# Patient Record
Sex: MALE | NOT HISPANIC OR LATINO | Employment: FULL TIME | URBAN - METROPOLITAN AREA
[De-identification: names, ages, dates, MRNs, and addresses within clinical notes are randomized per-mention and may not be internally consistent; named-entity substitution may affect disease eponyms.]

---

## 2024-09-28 ENCOUNTER — OFFICE VISIT (OUTPATIENT)
Dept: URGENT CARE | Age: 34
End: 2024-09-28
Payer: COMMERCIAL

## 2024-09-28 VITALS
RESPIRATION RATE: 16 BRPM | TEMPERATURE: 97.8 F | SYSTOLIC BLOOD PRESSURE: 121 MMHG | DIASTOLIC BLOOD PRESSURE: 76 MMHG | OXYGEN SATURATION: 98 % | HEART RATE: 67 BPM

## 2024-09-28 DIAGNOSIS — S83.401A SPRAIN OF COLLATERAL LIGAMENT OF RIGHT KNEE, INITIAL ENCOUNTER: Primary | ICD-10-CM

## 2024-09-28 DIAGNOSIS — M25.461 EFFUSION OF RIGHT KNEE JOINT: ICD-10-CM

## 2024-09-28 RX ORDER — METHYLPREDNISOLONE 4 MG/1
TABLET ORAL
Qty: 21 TABLET | Refills: 0 | Status: SHIPPED | OUTPATIENT
Start: 2024-09-28

## 2024-09-28 ASSESSMENT — PAIN SCALES - GENERAL: PAINLEVEL: 4

## 2024-09-28 NOTE — PATIENT INSTRUCTIONS
You have a right knee sprain with effusion please apply cold compresses to affected area for 20-30 minutes daily to reduce swelling.  May take Tylenol (acetaminophen) 325 mg, 2 tablets by mouth every 4-6 hours as needed for discomfort.   Please take Medrol as a single dose early in the day with food  May use diclofenac 1% gel topically as per label instructions to the injured knee.  Please follow-up with primary care provider in 7-10 days  If you have increased pain, redness, swelling, numbness, tingling, weakness return to urgent care or go to emergency department for further evaluation  This note was generated by voice recognition software. Minor transcription/grammatical errors may be present. Please call for clarification.

## 2024-10-10 ASSESSMENT — ENCOUNTER SYMPTOMS
FREQUENCY: 0
CHILLS: 0
WHEEZING: 0
WOUND: 0
ARTHRALGIAS: 1
DIARRHEA: 0
HEADACHES: 0
MYALGIAS: 0
NAUSEA: 0
DYSURIA: 0
SINUS PRESSURE: 1
CONSTIPATION: 0
RHINORRHEA: 1
SHORTNESS OF BREATH: 0
BACK PAIN: 0
FEVER: 0
JOINT SWELLING: 1
NECK STIFFNESS: 0
VOMITING: 0
SORE THROAT: 0
CHEST TIGHTNESS: 0

## 2024-10-10 NOTE — PROGRESS NOTES
"Subjective   Patient ID: Jelani Bender is a 33 y.o. male.    33-year-old male presents with a complaint of swelling of his right knee that began yesterday.  He reports that 2 weeks ago while playing flag football he dove to make a grab of the flag and landed on his right knee.  He reports that he \"works out a lot\".  Vital signs are reviewed. Alert and oriented x3 with normal mood and affect  Patient is well nourished, well-developed, alert and in no acute distress  No pain to palpation over frontal, ethmoid or maxillary sinus areas    External eyes, orbits, conjunctiva and eyelids are normal in appearance  Pupils are equal, round, reactive to light and accommodation, extraocular movements intact    External ears appear normal  External canals are normal in appearance  Right tympanic membrane is intact and pearly gray in appearance  Left tympanic membrane is intact and pearly gray in appearance  There is no middle ear effusion noted on the right  There is no middle ear effusion noted on the left  External appearance of the nose is normal  Nasal mucosa, septum, turbinates are pink in appearance  There is no nasal discharge in both nares    Oral mucosa is uniformly pink and moist  Palate is pink, symmetric and intact  Tongue is moist, mobile and midline  Tonsils are present, not enlarged, not erythematous with no concretions or exudates present  No cervical lymphadenopathy palpated    Heart has regular rate and rhythm. No murmurs, rubs or gallops are auscultated at this exam.    Respiratory rate rhythm and effort are normal. Breath sounds bilaterally are clear on auscultation without crackles, rhonchi, wheezes or friction rub.    Abdomen: Normal bowel sounds on auscultation. Soft, nontender without rebound or rigidity on palpation        Vital signs are reviewed. Alert and oriented x3 with normal mood and affect  Patient is well nourished, well-developed, alert and in no acute distress  No pain to palpation " over frontal, ethmoid or maxillary sinus areas    External eyes, orbits, conjunctiva and eyelids are normal in appearance  Pupils are equal, round, reactive to light and accommodation, extraocular movements intact    External ears appear normal  External canals are normal in appearance  Right tympanic membrane is intact and pearly gray in appearance  Left tympanic membrane is intact and pearly gray in appearance  There is no middle ear effusion noted on the right  There is no middle ear effusion noted on the left  External appearance of the nose is normal  Nasal mucosa, septum, turbinates are pink in appearance  There is no nasal discharge in both nares    Oral mucosa is uniformly pink and moist  Palate is pink, symmetric and intact  Tongue is moist, mobile and midline  Tonsils are present, not enlarged, not erythematous with no concretions or exudates present  No cervical lymphadenopathy palpated    Heart has regular rate and rhythm. No murmurs, rubs or gallops are auscultated at this exam.    Respiratory rate rhythm and effort are normal. Breath sounds bilaterally are clear on auscultation without crackles, rhonchi, wheezes or friction rub.    Abdomen: Normal bowel sounds on auscultation. Soft, nontender without rebound or rigidity on palpation              History provided by:  Patient  Knee Pain         The following portions of the chart were reviewed this encounter and updated as appropriate:  Allergies  Meds  Problems  Med Hx  Surg Hx  Fam Hx         Review of Systems   Constitutional:  Negative for chills and fever.   HENT:  Positive for congestion, rhinorrhea and sinus pressure. Negative for ear pain and sore throat.    Respiratory:  Negative for chest tightness, shortness of breath and wheezing.    Gastrointestinal:  Negative for constipation, diarrhea, nausea and vomiting.   Genitourinary:  Negative for dysuria and frequency.   Musculoskeletal:  Positive for arthralgias and joint swelling. Negative  for back pain, myalgias and neck stiffness.   Skin:  Negative for wound.   Neurological:  Negative for headaches.     Objective   Vital signs are reviewed. Alert and oriented x3 with normal mood and affect  Patient is well nourished, well-developed, alert and in no acute distress    External eyes, orbits, conjunctiva and eyelids are normal in appearance  Pupils are equal, round, reactive to light and accommodation, extraocular movements intact    External ears appear normal  External canals are normal in appearance  Right tympanic membrane is intact and pale gray in appearance  Left tympanic membrane is intact and pale posterior gray in appearance  There is no middle ear effusion noted on the right  There is no middle ear effusion noted on the left  External appearance of the nose is normal  Nasal mucosa, septum, turbinates are dark pink and moderately swollen in appearance  There is no nasal discharge in both nares    Oral mucosa is uniformly pink and moist  Palate is pink, symmetric and intact  Tongue is moist, mobile and midline  Posterior pharynx not erythematous with no concretions or exudates present  No cervical lymphadenopathy palpated    Heart has regular rate and rhythm. No murmurs, rubs or gallops are auscultated at this exam.    Respiratory rate rhythm and effort are normal. Breath sounds bilaterally are clear on auscultation without crackles, rhonchi, wheezes or friction rub.    Abdomen: Normal bowel sounds on auscultation. Soft, nontender without rebound or rigidity on palpation    Extremities: Right knee: Patient denies any pain in the quadriceps tendon.  Denies pain over the vastus medialis or vastus lateralis.  He denies any tenderness over the pes anserinus bursa or the associated tendons.  There is no pain at the suprapatellar bursa, patella, improper patellar bursa or patellar tendon.  There is visible swelling and palpable fullness suspicious for effusion within the retinaculum of the knee.   Patient denies any pain to palpation over the medial meniscus.  He denies pain over the medial collateral ligament.  He reports no discomfort with palpation of the lateral meniscus.  He indicates pain and discomfort over the lateral collateral ligament.  There is no obvious swelling and no tenderness in the popliteal space.  Patient does not demonstrate any ligamentous laxity on anterior or posterior drawer test.  There is no visible ecchymoses at this time.          Procedures    Assessment/Plan   Diagnoses and all orders for this visit:  Sprain of collateral ligament of right knee, initial encounter  -     methylPREDNISolone (Medrol Dospak) 4 mg tablets; Please take one row of tabs early in the day with food each day  Effusion of right knee joint  -     methylPREDNISolone (Medrol Dospak) 4 mg tablets; Please take one row of tabs early in the day with food each day    Patient disposition: Home

## 2025-07-21 ENCOUNTER — OFFICE VISIT (OUTPATIENT)
Dept: URGENT CARE | Age: 35
End: 2025-07-21
Payer: COMMERCIAL

## 2025-07-21 VITALS
SYSTOLIC BLOOD PRESSURE: 132 MMHG | OXYGEN SATURATION: 98 % | RESPIRATION RATE: 18 BRPM | DIASTOLIC BLOOD PRESSURE: 84 MMHG | HEART RATE: 87 BPM | TEMPERATURE: 98 F

## 2025-07-21 DIAGNOSIS — L23.7 POISON SUMAC: Primary | ICD-10-CM

## 2025-07-21 RX ORDER — METHYLPREDNISOLONE SODIUM SUCCINATE 125 MG/2ML
125 INJECTION INTRAMUSCULAR; INTRAVENOUS ONCE
Status: COMPLETED | OUTPATIENT
Start: 2025-07-21 | End: 2025-07-21

## 2025-07-21 RX ORDER — METHYLPREDNISOLONE 4 MG/1
TABLET ORAL
Qty: 21 TABLET | Refills: 0 | Status: SHIPPED | OUTPATIENT
Start: 2025-07-21

## 2025-07-21 RX ADMIN — METHYLPREDNISOLONE SODIUM SUCCINATE 125 MG: 125 INJECTION INTRAMUSCULAR; INTRAVENOUS at 14:28

## 2025-07-21 NOTE — PROGRESS NOTES
Subjective   Patient ID: Jelani Bender is a 34 y.o. male. They present today with a chief complaint of Illness (Came in contact with and had a bad reaction to poison sumac 2 weeks ago- Entered by patient //Went to ER 7/12 ).    History of Present Illness  Allergies: Reviewed.   Past medical history: Reviewed.   Past surgical history Reviewed.   Social history: Reviewed.    HPI: Patient is a pleasant 34-year-old white male, no significant past medical history, presented to clinic for chief complaint of poison sumac.  Patient states he is a  and was ripping out a fence around July 10.  States he came in contact with poison sumac and has had a very bad reaction.  He was seen and evaluated at the outside provider Medrol Dosepak which he does note has provided some improvement in his presenting with concern for persistent symptoms.  He also feels like he is getting up more places with the rash.  He denies any numbness tingling or weakness.  No fever or chills.  No chest pain or shortness of breath.        Illness      Past Medical History  Allergies as of 07/21/2025    (No Known Allergies)       Prescriptions Prior to Admission[1]       Medical History[2]    Surgical History[3]     reports that he has never smoked. He has never used smokeless tobacco.    Review of Systems  Review of Systems                               Objective    Vitals:    07/21/25 1410   BP: 132/84   Pulse: 87   Resp: 18   Temp: 36.7 °C (98 °F)   SpO2: 98%     No LMP for male patient.    Physical Exam  General: Vitals Noted. No distress. Normocephalic.     HEENT: TMs normal, EOMI, normal conjunctiva, patent nares, Normal OP    Neck: Supple with no adenopathy.     Cardiac: Regular Rate and Rhythm. No murmur.     Pulmonary: Equal breath sounds bilaterally. No wheezes, rhonchi, or rales.    Abdomen: Soft, non-tender, with normal bowel sounds.     Musculoskeletal: Moves all extremities, no effusion, no edema.     Skin:  There is a rash consisting of a combination of papules and vesicles on erythematous base over the bilateral thighs, bilateral forearms, and bilateral shins.  There are some signs of excoriation however no breaks in skin.  There is no associated induration or warmth.  No drainage.  Procedures    Point of Care Test & Imaging Results from this visit    Imaging  No results found.    Cardiology, Vascular, and Other Imaging  No other imaging results found for the past 2 days      Diagnostic study results (if any) were reviewed by Xiang Claudio PA-C.    Assessment/Plan   Allergies, medications, history, and pertinent labs/EKGs/Imaging reviewed by Xiang Claudio PA-C.     Medical Decision Making  Patient was seen eval in the clinic for chief complaint of rash secondary to poison sumac.  On exam patient is nontoxic very well-appearing resting comfortably no acute distress.  Vital signs are stable, afebrile.  Chest clear, heart is regular, belly is diffusely soft and nontender.  Evaluation of skin as above very consistent with poison sumac dermatitis.  125 mg Solu-Medrol IM was administered in the clinic with discharged home with a Medrol Dosepak.  Advised to follow close with his primary care physician the next week.  I reviewed my impression, plan, strict return precautions with the patient.  He expresses understanding and agreement with plan of care.    Orders and Diagnoses  Diagnoses and all orders for this visit:  Poison sumac  -     methylPREDNISolone sodium succinate (PF) (SOLU-Medrol) injection 125 mg  -     methylPREDNISolone (Medrol Dospak) 4 mg tablets; Take as directed on package.        Medical Admin Record  Administrations This Visit       methylPREDNISolone sodium succinate (PF) (SOLU-Medrol) injection 125 mg       Admin Date  07/21/2025 Action  Given Dose  125 mg Route  intramuscular Documented By  Aure Blankenship MA                    Follow Up Instructions  No follow-ups on file.    Patient  disposition: Home    Electronically signed by Xiang Claudio PA-C  2:31 PM         [1] (Not in a hospital admission)  [2] No past medical history on file.  [3] No past surgical history on file.

## 2025-07-30 ENCOUNTER — OFFICE VISIT (OUTPATIENT)
Dept: URGENT CARE | Age: 35
End: 2025-07-30
Payer: COMMERCIAL

## 2025-07-30 VITALS
RESPIRATION RATE: 18 BRPM | HEIGHT: 66 IN | TEMPERATURE: 98.1 F | DIASTOLIC BLOOD PRESSURE: 87 MMHG | HEART RATE: 68 BPM | SYSTOLIC BLOOD PRESSURE: 137 MMHG | WEIGHT: 195 LBS | OXYGEN SATURATION: 96 % | BODY MASS INDEX: 31.34 KG/M2

## 2025-07-30 DIAGNOSIS — L25.5 CONTACT DERMATITIS DUE TO PLANTS, EXCEPT FOOD, UNSPECIFIED CONTACT DERMATITIS TYPE: Primary | ICD-10-CM

## 2025-07-30 RX ORDER — TRIAMCINOLONE ACETONIDE 40 MG/ML
40 INJECTION, SUSPENSION INTRA-ARTICULAR; INTRAMUSCULAR ONCE
Status: COMPLETED | OUTPATIENT
Start: 2025-07-30 | End: 2025-07-30

## 2025-07-30 RX ORDER — TRIAMCINOLONE ACETONIDE 1 MG/G
1 CREAM TOPICAL 2 TIMES DAILY
Qty: 2 G | Refills: 0 | Status: SHIPPED | OUTPATIENT
Start: 2025-07-30 | End: 2025-08-09

## 2025-07-30 RX ORDER — TRIAMCINOLONE ACETONIDE 1 MG/G
CREAM TOPICAL 2 TIMES DAILY
Qty: 30 G | Refills: 0 | Status: SHIPPED | OUTPATIENT
Start: 2025-07-30

## 2025-07-30 RX ADMIN — TRIAMCINOLONE ACETONIDE 40 MG: 40 INJECTION, SUSPENSION INTRA-ARTICULAR; INTRAMUSCULAR at 09:22

## 2025-07-30 NOTE — PATIENT INSTRUCTIONS
Aveno bath oatmeal can help dry rash    Contact our scheduling for derm appointment.    Always feel free to return as needed

## 2025-07-30 NOTE — PROGRESS NOTES
"Subjective   Patient ID: Jelani Bender is a 34 y.o. male. They present today with a chief complaint of Rash (Got poison ivy on arms three weeks ago and that went away but now its spreading to legs and torso with no new exposure).    History of Present Illness  34-year-old male presents today with concern for contact dermatitis he works outside in construction and has had prior history of contact dermatitis.  He was seen in our urgent care 9 days ago and they gave him a shot of Solu-Medrol and put him on steroids.  Unfortunately has not helped.  He still experiencing dryness and rash.  He denies fever or chills.  He denies chest pain or dyspnea.  He denies abdominal pain, nausea, or vomiting.      History provided by:  Patient   used: No    Rash        Past Medical History  Allergies as of 07/30/2025    (No Known Allergies)       Prescriptions Prior to Admission[1]     Medical History[2]    Surgical History[3]     reports that he has never smoked. He has never used smokeless tobacco.    Review of Systems  Review of Systems   Skin:  Positive for rash.                                  Objective    Vitals:    07/30/25 0831   BP: 137/87   Pulse: 68   Resp: 18   Temp: 36.7 °C (98.1 °F)   SpO2: 96%   Weight: 88.5 kg (195 lb)   Height: 1.676 m (5' 6\")     No LMP for male patient.    Physical Exam  Constitutional:       Appearance: Normal appearance.   HENT:      Head: Normocephalic and atraumatic.      Nose: Nose normal.      Mouth/Throat:      Mouth: Mucous membranes are moist.     Eyes:      Pupils: Pupils are equal, round, and reactive to light.       Cardiovascular:      Rate and Rhythm: Normal rate and regular rhythm.      Pulses: Normal pulses.      Heart sounds: Normal heart sounds.   Pulmonary:      Effort: Pulmonary effort is normal.      Breath sounds: Normal breath sounds.   Abdominal:      General: Abdomen is flat.     Musculoskeletal:         General: Normal range of motion.      " Cervical back: Normal range of motion and neck supple.     Skin:     General: Skin is warm.      Capillary Refill: Capillary refill takes less than 2 seconds.      Findings: Rash present.     Neurological:      General: No focal deficit present.      Mental Status: He is alert and oriented to person, place, and time.     Psychiatric:         Mood and Affect: Mood normal.         Procedures    Point of Care Test & Imaging Results from this visit  No results found for this visit on 07/30/25.   Imaging  No results found.    Cardiology, Vascular, and Other Imaging  No other imaging results found for the past 2 days      Diagnostic study results (if any) were reviewed by BLUE Chang.    Assessment/Plan   Allergies, medications, history, and pertinent labs/EKGs/Imaging reviewed by BLUE Chang.     Medical Decision Making  Patient had contact dermatitis.  He received 40 mg IM Kenalog.  He was placed on triamcinolone cream.  Will continue to use Benadryl.  I requested appointment with dermatology for outpatient follow-up.  Return precautions reviewed.    Orders and Diagnoses  Diagnoses and all orders for this visit:  Contact dermatitis due to plants, except food, unspecified contact dermatitis type  -     triamcinolone acetonide (Kenalog-40) injection 40 mg  -     triamcinolone (Kenalog) 0.1 % cream; Apply topically 2 times a day.  -     Referral to Dermatology      Medical Admin Record      Patient disposition: Home    Electronically signed by BLUE Chang  9:12 AM           [1] (Not in a hospital admission)   [2] No past medical history on file.  [3] No past surgical history on file.

## 2025-07-31 ENCOUNTER — TELEPHONE (OUTPATIENT)
Dept: URGENT CARE | Age: 35
End: 2025-07-31

## 2025-08-12 ENCOUNTER — OFFICE VISIT (OUTPATIENT)
Dept: URGENT CARE | Age: 35
End: 2025-08-12
Payer: COMMERCIAL

## 2025-08-12 VITALS
BODY MASS INDEX: 31.34 KG/M2 | HEIGHT: 66 IN | WEIGHT: 195 LBS | OXYGEN SATURATION: 99 % | DIASTOLIC BLOOD PRESSURE: 93 MMHG | HEART RATE: 66 BPM | TEMPERATURE: 98 F | RESPIRATION RATE: 18 BRPM | SYSTOLIC BLOOD PRESSURE: 146 MMHG

## 2025-08-12 DIAGNOSIS — R05.1 ACUTE COUGH: ICD-10-CM

## 2025-08-12 DIAGNOSIS — J06.9 ACUTE URI: Primary | ICD-10-CM

## 2025-08-12 DIAGNOSIS — J02.9 SORE THROAT: ICD-10-CM

## 2025-08-12 DIAGNOSIS — R03.0 ELEVATED BLOOD PRESSURE READING WITHOUT DIAGNOSIS OF HYPERTENSION: ICD-10-CM

## 2025-08-12 LAB
POC HUMAN RHINOVIRUS PCR: NEGATIVE
POC INFLUENZA A VIRUS PCR: NEGATIVE
POC INFLUENZA B VIRUS PCR: NEGATIVE
POC RAPID MONO: NEGATIVE
POC RESPIRATORY SYNCYTIAL VIRUS PCR: NEGATIVE
POC SARS-COV-2 AG BINAX: NORMAL
POC STREPTOCOCCUS PYOGENES (GROUP A STREP) PCR: NEGATIVE

## 2025-08-12 PROCEDURE — 87651 STREP A DNA AMP PROBE: CPT | Performed by: NURSE PRACTITIONER

## 2025-08-12 PROCEDURE — 99214 OFFICE O/P EST MOD 30 MIN: CPT | Performed by: NURSE PRACTITIONER

## 2025-08-12 PROCEDURE — 87811 SARS-COV-2 COVID19 W/OPTIC: CPT | Performed by: NURSE PRACTITIONER

## 2025-08-12 PROCEDURE — 87631 RESP VIRUS 3-5 TARGETS: CPT | Performed by: NURSE PRACTITIONER

## 2025-08-12 PROCEDURE — 86308 HETEROPHILE ANTIBODY SCREEN: CPT | Performed by: NURSE PRACTITIONER

## 2025-08-12 PROCEDURE — 1036F TOBACCO NON-USER: CPT | Performed by: NURSE PRACTITIONER

## 2025-08-12 PROCEDURE — 3008F BODY MASS INDEX DOCD: CPT | Performed by: NURSE PRACTITIONER

## 2025-08-12 RX ORDER — LEVOCETIRIZINE DIHYDROCHLORIDE 5 MG/1
5 TABLET, FILM COATED ORAL EVERY EVENING
Qty: 30 TABLET | Refills: 0 | Status: SHIPPED | OUTPATIENT
Start: 2025-08-12 | End: 2025-09-11

## 2025-08-12 ASSESSMENT — ENCOUNTER SYMPTOMS
COUGH: 1
RHINORRHEA: 0
ARTHRALGIAS: 0
FEVER: 0
DIARRHEA: 0
SORE THROAT: 1
ABDOMINAL PAIN: 0
CHEST TIGHTNESS: 0
PALPITATIONS: 0
FATIGUE: 1
BACK PAIN: 0
NAUSEA: 0
CONSTIPATION: 0
CHILLS: 1
JOINT SWELLING: 0
EYE PAIN: 0
DIFFICULTY URINATING: 0
DIZZINESS: 0
SHORTNESS OF BREATH: 0
VOMITING: 0
FACIAL SWELLING: 0
HEADACHES: 1
WOUND: 0
APPETITE CHANGE: 0
MYALGIAS: 0
SINUS PRESSURE: 1
SINUS PAIN: 0

## 2025-08-12 ASSESSMENT — VISUAL ACUITY: OU: 1

## 2025-08-17 ENCOUNTER — OFFICE VISIT (OUTPATIENT)
Dept: URGENT CARE | Age: 35
End: 2025-08-17
Payer: COMMERCIAL

## 2025-08-17 ENCOUNTER — ANCILLARY PROCEDURE (OUTPATIENT)
Dept: URGENT CARE | Age: 35
End: 2025-08-17
Payer: COMMERCIAL

## 2025-08-17 VITALS
BODY MASS INDEX: 31.34 KG/M2 | DIASTOLIC BLOOD PRESSURE: 81 MMHG | OXYGEN SATURATION: 95 % | SYSTOLIC BLOOD PRESSURE: 145 MMHG | HEART RATE: 81 BPM | WEIGHT: 195 LBS | HEIGHT: 66 IN | RESPIRATION RATE: 20 BRPM | TEMPERATURE: 98.1 F

## 2025-08-17 DIAGNOSIS — J01.10 ACUTE NON-RECURRENT FRONTAL SINUSITIS: Primary | ICD-10-CM

## 2025-08-17 DIAGNOSIS — R09.81 NASAL CONGESTION: ICD-10-CM

## 2025-08-17 DIAGNOSIS — J40 BRONCHITIS: ICD-10-CM

## 2025-08-17 PROCEDURE — 71046 X-RAY EXAM CHEST 2 VIEWS: CPT | Performed by: PHYSICIAN ASSISTANT

## 2025-08-17 PROCEDURE — 3008F BODY MASS INDEX DOCD: CPT | Performed by: PHYSICIAN ASSISTANT

## 2025-08-17 PROCEDURE — 99213 OFFICE O/P EST LOW 20 MIN: CPT | Performed by: PHYSICIAN ASSISTANT

## 2025-08-17 PROCEDURE — 1036F TOBACCO NON-USER: CPT | Performed by: PHYSICIAN ASSISTANT

## 2025-08-17 RX ORDER — AMOXICILLIN AND CLAVULANATE POTASSIUM 875; 125 MG/1; MG/1
875 TABLET, FILM COATED ORAL 2 TIMES DAILY
Qty: 20 TABLET | Refills: 0 | Status: SHIPPED | OUTPATIENT
Start: 2025-08-17

## 2025-08-17 RX ORDER — ALBUTEROL SULFATE 90 UG/1
2 INHALANT RESPIRATORY (INHALATION) EVERY 6 HOURS PRN
Qty: 18 G | Refills: 0 | Status: SHIPPED | OUTPATIENT
Start: 2025-08-17 | End: 2026-08-17

## 2025-08-17 RX ORDER — ALBUTEROL SULFATE 90 UG/1
2 INHALANT RESPIRATORY (INHALATION) EVERY 6 HOURS PRN
Qty: 18 G | Refills: 0 | Status: SHIPPED | OUTPATIENT
Start: 2025-08-17 | End: 2025-08-17

## 2025-08-17 RX ORDER — AMOXICILLIN AND CLAVULANATE POTASSIUM 875; 125 MG/1; MG/1
875 TABLET, FILM COATED ORAL 2 TIMES DAILY
Qty: 20 TABLET | Refills: 0 | Status: SHIPPED | OUTPATIENT
Start: 2025-08-17 | End: 2025-08-17

## 2025-11-26 ENCOUNTER — APPOINTMENT (OUTPATIENT)
Dept: DERMATOLOGY | Facility: CLINIC | Age: 35
End: 2025-11-26
Payer: COMMERCIAL